# Patient Record
Sex: FEMALE | Race: WHITE | Employment: FULL TIME | URBAN - METROPOLITAN AREA
[De-identification: names, ages, dates, MRNs, and addresses within clinical notes are randomized per-mention and may not be internally consistent; named-entity substitution may affect disease eponyms.]

---

## 2019-05-30 ENCOUNTER — APPOINTMENT (OUTPATIENT)
Dept: GENERAL RADIOLOGY | Facility: CLINIC | Age: 49
End: 2019-05-30
Attending: EMERGENCY MEDICINE

## 2019-05-30 ENCOUNTER — HOSPITAL ENCOUNTER (EMERGENCY)
Facility: CLINIC | Age: 49
Discharge: HOME | End: 2019-05-30
Attending: EMERGENCY MEDICINE | Admitting: EMERGENCY MEDICINE
Payer: MEDICAID

## 2019-05-30 VITALS
DIASTOLIC BLOOD PRESSURE: 94 MMHG | SYSTOLIC BLOOD PRESSURE: 127 MMHG | HEART RATE: 91 BPM | WEIGHT: 147 LBS | TEMPERATURE: 97 F | RESPIRATION RATE: 16 BRPM | OXYGEN SATURATION: 97 %

## 2019-05-30 DIAGNOSIS — S49.91XA SHOULDER INJURY, RIGHT, INITIAL ENCOUNTER: ICD-10-CM

## 2019-05-30 PROCEDURE — 99283 EMERGENCY DEPT VISIT LOW MDM: CPT | Mod: Z6 | Performed by: EMERGENCY MEDICINE

## 2019-05-30 PROCEDURE — 73030 X-RAY EXAM OF SHOULDER: CPT | Mod: TC,RT

## 2019-05-30 PROCEDURE — 99283 EMERGENCY DEPT VISIT LOW MDM: CPT | Performed by: EMERGENCY MEDICINE

## 2019-05-30 NOTE — LETTER
May 30, 2019      To Whom It May Concern:      Andra Blanchard was seen in our Emergency Department today, 05/30/19.  I expect her condition to improve over the next 7 days.  She may return to work but needs to rest her right arm until cleared by her doctor.     Sincerely,        Oz Jay MD

## 2019-05-30 NOTE — ED TRIAGE NOTES
Presents with R) shoulder injury from being hit by her dog in the yard yesterday afternoon. Shahla Diop RN

## 2019-05-30 NOTE — ED AVS SNAPSHOT
[facility] Emergency Department  [address] [place] [address]  Phone:  [phone]  Fax:  [phone]                                    Andra Blanchard   MRN: [id number]    Department:  [facility] Emergency Department   Date of Visit:  5/30/2019           After Visit Summary Signature Page    I have received my discharge instructions, and my questions have been answered. I have discussed any challenges I see with this plan with the nurse or doctor.    ..........................................................................................................................................  Patient/Patient Representative Signature      ..........................................................................................................................................  Patient Representative Print Name and Relationship to Patient    ..................................................               ................................................  Date                                   Time    ..........................................................................................................................................  Reviewed by Signature/Title    ...................................................              ..............................................  Date                                               Time          22EPIC Rev 08/18
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
## 2019-05-30 NOTE — DISCHARGE INSTRUCTIONS
Most likely you have a partial tear of your rotator cuff.  As discussed I recommend ice, ibuprofen, range of motion as tolerated.  Follow-up with sports medicine doctor and if your symptoms persist they may want to pursue an MRI.  Physical therapy will likely be helpful for this.  Your x-ray was normal.
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
## 2019-06-03 ENCOUNTER — TELEPHONE (OUTPATIENT)
Dept: ORTHOPEDICS | Facility: OTHER | Age: 49
End: 2019-06-03

## 2019-06-03 ENCOUNTER — OFFICE VISIT (OUTPATIENT)
Dept: ORTHOPEDICS | Facility: OTHER | Age: 49
End: 2019-06-03
Payer: COMMERCIAL

## 2019-06-03 VITALS
BODY MASS INDEX: 27.6 KG/M2 | SYSTOLIC BLOOD PRESSURE: 128 MMHG | DIASTOLIC BLOOD PRESSURE: 76 MMHG | WEIGHT: 150 LBS | HEIGHT: 62 IN

## 2019-06-03 DIAGNOSIS — S49.91XA INJURY OF RIGHT SHOULDER, INITIAL ENCOUNTER: Primary | ICD-10-CM

## 2019-06-03 PROCEDURE — 99203 OFFICE O/P NEW LOW 30 MIN: CPT | Performed by: PHYSICAL MEDICINE & REHABILITATION

## 2019-06-03 RX ORDER — LEVOTHYROXINE SODIUM 50 UG/1
TABLET ORAL
COMMUNITY
Start: 2019-04-29

## 2019-06-03 RX ORDER — VALSARTAN AND HYDROCHLOROTHIAZIDE 160; 12.5 MG/1; MG/1
TABLET, FILM COATED ORAL
COMMUNITY
Start: 2018-11-29

## 2019-06-03 ASSESSMENT — MIFFLIN-ST. JEOR: SCORE: 1261.27

## 2019-06-03 NOTE — TELEPHONE ENCOUNTER
Reason for Call:  Form, our goal is to have forms completed with 72 hours, however, some forms may require a visit or additional information.    Type of letter, form or note:  FMLA    Who is the form from?: Employer (if other please explain)    Where did the form come from: form was faxed in    What clinic location was the form placed at?: [facility] - [phone]    Where the form was placed: DRs Box/Folder    What number is listed as a contact on the form?: [phone]       Additional comments: Please complete and fax back to [phone].    Call taken on 6/3/2019 at 12:52 PM by Ignacio Carlton

## 2019-06-03 NOTE — LETTER
Renetta 3, 2019      Andra Blanchard  [address]  [place] [address]        To Whom It May Concern:    Andra Blanchard  was seen on 6/3/19 for a right shoulder injury. 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 She will be seen in follow up in 2 weeks for re-evaluation. Thank you for your help in advance.         Sincerely,            Viviana Humphrey MD

## 2019-06-03 NOTE — PATIENT INSTRUCTIONS
Today's Plan of Care:  -Ice or ice massage 15-20 minutes for pain relief or swelling as needed  -Patient's preferred over the counter medication as directed on packaging as needed for pain or soreness.  -Provided home exercises. 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      -We also discussed other future treatment options:  MRI imaging and physical therapy    Follow Up: 2 weeks or sooner if symptoms fail to improve or worsen. Please call with any questions or concerns.

## 2019-06-03 NOTE — TELEPHONE ENCOUNTER
Reason for Call:  Other Letter for work    Detailed comments: The letter that she got from Dr Humphrey needs to be more specific. 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 Please fax to work. [phone] Tam Krueger    Phone Number Patient can be reached at: Home number on file [phone] (home)    Best Time: any    Can we leave a detailed message on this number? YES    Call taken on 6/3/2019 at 12:10 PM by Erum Pena

## 2019-06-03 NOTE — LETTER
Renetta 3, 2019      Andra Blanchard  [address]  [place] [address]        To Whom It May Concern:    Andra Blanchard  was seen on 6/3/19 for a right shoulder injury. She is unable to work at this time. She will be seen in follow up in 2 weeks for re-evaluation. Thank you for your help in advance.         Sincerely,        Viviana Humphrey MD
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
## 2019-06-03 NOTE — TELEPHONE ENCOUNTER
[facility] [phone]. Confirmed sent by [employer] on 6/3/2019 at 6:40pm.  ([id number])    Jewell Stevens M.Ed., LAT, ATC  Clinic Coordinator for Dr. Charlene Humphrey

## 2019-06-13 NOTE — PROGRESS NOTES
Sports Medicine Clinic Visit - Interim History June 13, 2019    Initial Visit Date 6/3/2019  Initial Injury Date 5/29/19    PCP: No Ref-Primary, Physician    Andraulises Blanchard is a 48 year old female who is seen in follow up for a right shoulder injury. Since last visit on 6/3/2019 patient has had ~80% improvement in the shoulder. She notes that she still gets sharp pain in the shoulder with certain movement such as prolonged abduction, reaching overhead and applying hairspray. She has been avoiding heavy lifting. She gets fatigue with holding her arm up to ear or drive for long periods of time. She also notes soreness in her lateral forearm. She rates the pain at a 2/10 currently.  Symptoms are relieved with rest and home exercises.  Symptoms are worsened by certain movement. 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    - Now ~ 2.5 weeks from initial injury      Review of Systems  Musculoskeletal: as above  Remainder of review of systems is negative including constitutional, eyes, ENT, CV, pulmonary, GI, , endocrine, skin, hematologic, and neurologic except as noted in HPI or medical history.    History reviewed. No pertinent past surgical/medical/family/social history other than as mentioned in HPI.    There is no problem list on file for this patient.    Past Medical History:   Diagnosis Date     Hypertension      Hyperthyroidism      Past Surgical History:   Procedure Laterality Date     NOSE SURGERY       No family history on file.  Social History     Socioeconomic History     Marital status: Single     Spouse name: Not on file     Number of children: Not on file     Years of education: Not on file     Highest education level: Not on file   Occupational History     Not on file   Social Needs     Financial resource strain: Not on file     Food insecurity:     Worry: Not on file     Inability: Not on file     Transportation needs:     Medical: Not on file      "Non-medical: Not on file   Tobacco Use     Smoking status: Never Smoker     Smokeless tobacco: Never Used   Substance and Sexual Activity     Alcohol use: Not on file     Drug use: Not on file     Sexual activity: Not on file   Lifestyle     Physical activity:     Days per week: Not on file     Minutes per session: Not on file     Stress: Not on file   Relationships     Social connections:     Talks on phone: Not on file     Gets together: Not on file     Attends Holiness service: Not on file     Active member of club or organization: Not on file     Attends meetings of clubs or organizations: Not on file     Relationship status: Not on file     Intimate partner violence:     Fear of current or ex partner: Not on file     Emotionally abused: Not on file     Physically abused: Not on file     Forced sexual activity: Not on file   Other Topics Concern     Not on file   Social History Narrative     Not on file     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       Current Outpatient Medications   Medication     levothyroxine (SYNTHROID/LEVOTHROID) 50 MCG tablet     valsartan-hydrochlorothiazide (DIOVAN HCT) 160-12.5 MG tablet     No current facility-administered medications for this visit.      No Known Allergies      Objective:  BP (P) 114/74   Ht (P) 1.563 m (5' 1.54\")   Wt (P) 68 kg (150 lb)   BMI (P) 27.85 kg/m      General: Alert and in no distress    Head: Normocephalic, atraumatic  Eyes: no scleral icterus or conjunctival erythema   Oropharynx:  Mucous membranes moist  Skin: no erythema, petechiae, or jaundice  CV: regular rhythm by palpation, 2+ distal pulses  Resp: normal respiratory effort without conversational dyspnea   Psych: normal mood and affect    Gait: Non-antalgic, appropriate coordination and balance   Neuro: Motor strength and sensation as noted below    Musculoskeletal:    Bilateral Shoulder exam    Inspection and Posture:       normal    Skin:        no " visible deformities    Tenderness:  None    ROM:        Full active ROM with flexion, extension, abduction, and adduction bilaterally.  Right external rotation and internal rotation behind the back are decreased.    Painful motions:  -Mild sarkis with right shoulder flexion, internal rotation behind the back, and external rotation.    Strength:        shoulder shrug 5/5 bilaterally       shoulder abduction 5/5 bilaterally       shoulder flexion 5/5 bilaterally       shoulder internal rotation 5/5 bilaterally       shoulder external rotation 4/5 right, 5/5 left       elbow flexion 5/5 bilaterally       elbow extension 5/5 bilaterally       forearm pronation 5/5 bilaterally       forearm supination 5/5 bilaterally       wrist flexion 5/5 bilaterally       wrist extension 5/5 bilaterally        strength 5/5 bilaterally       finger abduction 5/5 bilaterally    Sensation:        normal sensation over shoulder and upper extremity       Radiology:  Independent visualization of images performed  XR SHOULDER RT G/E 3 VW 5/30/2019 5:36 PM     HISTORY: Injury.      COMPARISON: None.                                                                      IMPRESSION: No evidence of acute fracture or malalignment. Mild  degenerative changes of the acromioclavicular joint.      MARIBEL HENRIQUEZ MD    Large Joint Injection/Arthocentesis: R subacromial bursa  Date/Time: 6/17/2019 6:54 PM  Performed by: Viviana Humphrey MD  Authorized by: Viviana Humphrey MD     Indications:  Pain  Needle Size:  25 G  Guidance: landmark guided    Approach:  Posterior  Location:  Shoulder      Site:  R subacromial bursa  Medications:  2 mL lidocaine 1 %; 40 mg triamcinolone 40 MG/ML  Medications comment:  2ml 0.5% bupivicaine  NDC:7243-1301-90  Lot: [id number]  8/1/20    Outcome:  Tolerated well, no immediate complications  Procedure discussed: discussed risks, benefits, and alternatives    Consent Given by:   Patient          Assessment:  1. Injury of right shoulder, subsequent encounter        Plan:  Discussed the assessment with the patient and developed a plan together:  -Steroid injection performed today.  Take it easy over the next few days. Keep in mind that the steroid may take up to 3 days to start working and up to 2 weeks to reach maximal effect.  Ice 15-20 minutes as needed for soreness.  Patient's preferred over the counter medication as needed for pain as directed on packaging.  -Continue home exercises. 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  Letters provided.    -We also discussed other future treatment options:  MRI imaging and physical therapy    Follow up as needed if symptoms fail to improve or worsen. Please call with any questions or concerns.       Charlene Humphrey MD, CAQ Sports Medicine  [facility]

## 2019-06-17 ENCOUNTER — OFFICE VISIT (OUTPATIENT)
Dept: ORTHOPEDICS | Facility: OTHER | Age: 49
End: 2019-06-17
Payer: COMMERCIAL

## 2019-06-17 DIAGNOSIS — S49.91XD INJURY OF RIGHT SHOULDER, SUBSEQUENT ENCOUNTER: Primary | ICD-10-CM

## 2019-06-17 PROCEDURE — 99213 OFFICE O/P EST LOW 20 MIN: CPT | Mod: 25 | Performed by: PHYSICAL MEDICINE & REHABILITATION

## 2019-06-17 PROCEDURE — 20610 DRAIN/INJ JOINT/BURSA W/O US: CPT | Mod: RT | Performed by: PHYSICAL MEDICINE & REHABILITATION

## 2019-06-17 RX ORDER — TRIAMCINOLONE ACETONIDE 40 MG/ML
40 INJECTION, SUSPENSION INTRA-ARTICULAR; INTRAMUSCULAR
Status: SHIPPED | OUTPATIENT
Start: 2019-06-17

## 2019-06-17 RX ORDER — LIDOCAINE HYDROCHLORIDE 10 MG/ML
2 INJECTION, SOLUTION INFILTRATION; PERINEURAL
Status: SHIPPED | OUTPATIENT
Start: 2019-06-17

## 2019-06-17 RX ADMIN — LIDOCAINE HYDROCHLORIDE 2 ML: 10 INJECTION, SOLUTION INFILTRATION; PERINEURAL at 18:54

## 2019-06-17 RX ADMIN — TRIAMCINOLONE ACETONIDE 40 MG: 40 INJECTION, SUSPENSION INTRA-ARTICULAR; INTRAMUSCULAR at 18:54

## 2019-06-17 ASSESSMENT — MIFFLIN-ST. JEOR: SCORE: 1261.35

## 2019-06-17 NOTE — LETTER
6/17/2019         RE: Andra Blanchard  [address]  [place] [address]        Dear Colleague,    Thank you for referring your patient, Andra Balnchard, to the [facility]. Please see a copy of my visit note below.    Sports Medicine Clinic Visit - Interim History June 13, 2019    Initial Visit Date 6/3/2019  Initial Injury Date 5/29/19    PCP: No Ref-Primary, Physician    Andra Blanchard is a 48 year old female who is seen in follow up for a right shoulder injury. Since last visit on 6/3/2019 patient has had ~80% improvement in the shoulder. She notes that she still gets sharp pain in the shoulder with certain movement such as prolonged abduction, reaching overhead and applying hairspray. She has been avoiding heavy lifting. She gets fatigue with holding her arm up to ear or drive for long periods of time. She also notes soreness in her lateral forearm. She rates the pain at a 2/10 currently.  Symptoms are relieved with rest and home exercises.  Symptoms are worsened by certain movement. 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    - Now ~ 2.5 weeks from initial injury      Review of Systems  Musculoskeletal: as above  Remainder of review of systems is negative including constitutional, eyes, ENT, CV, pulmonary, GI, , endocrine, skin, hematologic, and neurologic except as noted in HPI or medical history.    History reviewed. No pertinent past surgical/medical/family/social history other than as mentioned in HPI.    There is no problem list on file for this patient.    Past Medical History:   Diagnosis Date     Hypertension      Hyperthyroidism      Past Surgical History:   Procedure Laterality Date     NOSE SURGERY       No family history on file.  Social History     Socioeconomic History     Marital status: Single     Spouse name: Not on file     Number of children: Not on file     Years of education: Not on file     Highest education level:  "Not on file   Occupational History     Not on file   Social Needs     Financial resource strain: Not on file     Food insecurity:     Worry: Not on file     Inability: Not on file     Transportation needs:     Medical: Not on file     Non-medical: Not on file   Tobacco Use     Smoking status: Never Smoker     Smokeless tobacco: Never Used   Substance and Sexual Activity     Alcohol use: Not on file     Drug use: Not on file     Sexual activity: Not on file   Lifestyle     Physical activity:     Days per week: Not on file     Minutes per session: Not on file     Stress: Not on file   Relationships     Social connections:     Talks on phone: Not on file     Gets together: Not on file     Attends Adventism service: Not on file     Active member of club or organization: Not on file     Attends meetings of clubs or organizations: Not on file     Relationship status: Not on file     Intimate partner violence:     Fear of current or ex partner: Not on file     Emotionally abused: Not on file     Physically abused: Not on file     Forced sexual activity: Not on file   Other Topics Concern     Not on file   Social History Narrative     Not on file     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       Current Outpatient Medications   Medication     levothyroxine (SYNTHROID/LEVOTHROID) 50 MCG tablet     valsartan-hydrochlorothiazide (DIOVAN HCT) 160-12.5 MG tablet     No current facility-administered medications for this visit.      No Known Allergies      Objective:  BP (P) 114/74   Ht (P) 1.563 m (5' 1.54\")   Wt (P) 68 kg (150 lb)   BMI (P) 27.85 kg/m       General: Alert and in no distress    Head: Normocephalic, atraumatic  Eyes: no scleral icterus or conjunctival erythema   Oropharynx:  Mucous membranes moist  Skin: no erythema, petechiae, or jaundice  CV: regular rhythm by palpation, 2+ distal pulses  Resp: normal respiratory effort without conversational dyspnea   Psych: normal " mood and affect    Gait: Non-antalgic, appropriate coordination and balance   Neuro: Motor strength and sensation as noted below    Musculoskeletal:    Bilateral Shoulder exam    Inspection and Posture:       normal    Skin:        no visible deformities    Tenderness:  None    ROM:        Full active ROM with flexion, extension, abduction, and adduction bilaterally.  Right external rotation and internal rotation behind the back are decreased.    Painful motions:  -Mild sarkis with right shoulder flexion, internal rotation behind the back, and external rotation.    Strength:        shoulder shrug 5/5 bilaterally       shoulder abduction 5/5 bilaterally       shoulder flexion 5/5 bilaterally       shoulder internal rotation 5/5 bilaterally       shoulder external rotation 4/5 right, 5/5 left       elbow flexion 5/5 bilaterally       elbow extension 5/5 bilaterally       forearm pronation 5/5 bilaterally       forearm supination 5/5 bilaterally       wrist flexion 5/5 bilaterally       wrist extension 5/5 bilaterally        strength 5/5 bilaterally       finger abduction 5/5 bilaterally    Sensation:        normal sensation over shoulder and upper extremity       Radiology:  Independent visualization of images performed  XR SHOULDER RT G/E 3 VW 5/30/2019 5:36 PM     HISTORY: Injury.      COMPARISON: None.                                                                      IMPRESSION: No evidence of acute fracture or malalignment. Mild  degenerative changes of the acromioclavicular joint.      MARIBEL HENRIQUEZ MD    Large Joint Injection/Arthocentesis: R subacromial bursa  Date/Time: 6/17/2019 6:54 PM  Performed by: Viviana Humphrey MD  Authorized by: Viviana Humphrey MD     Indications:  Pain  Needle Size:  25 G  Guidance: landmark guided    Approach:  Posterior  Location:  Shoulder      Site:  R subacromial bursa  Medications:  2 mL lidocaine 1 %; 40 mg triamcinolone 40 MG/ML  Medications comment:   2ml 0.5% bupivicaine  NDC:7897-1476-00  Lot: [id number]  8/1/20    Outcome:  Tolerated well, no immediate complications  Procedure discussed: discussed risks, benefits, and alternatives    Consent Given by:  Patient          Assessment:  1. Injury of right shoulder, subsequent encounter        Plan:  Discussed the assessment with the patient and developed a plan together:  -Steroid injection performed today.  Take it easy over the next few days. Keep in mind that the steroid may take up to 3 days to start working and up to 2 weeks to reach maximal effect.  Ice 15-20 minutes as needed for soreness.  Patient's preferred over the counter medication as needed for pain as directed on packaging.  -Continue home exercises. 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  Letters provided.    -We also discussed other future treatment options:  MRI imaging and physical therapy    Follow up as needed if symptoms fail to improve or worsen. Please call with any questions or concerns.       Charlene Humphrey MD, [facility] Sports Medicine  [facility]        Again, thank you for allowing me to participate in the care of your patient.        Sincerely,        Viviana Humphrey MD

## 2019-06-17 NOTE — LETTER
June 17, 2019      Andra Blanchard  [address]  [place] [address]        To Whom It May Concern:    Andra Blanchard  was seen in follow up on  6/17/19 for a right shoulder injury. 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 Also, she is cleared to continue working her  job.  She will be seen in follow up only if symptoms do not improve. Thank you for your help in advance.          Sincerely,            Viviana Humphrey MD

## 2019-06-17 NOTE — LETTER
June 17, 2019      Andra Blanchard  [address]  [place] [address]        To Whom It May Concern:    Andra Blanchard  was seen in follow up on  6/17/19 for a right shoulder injury. 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 Also, she is cleared to continue working her  job.  She will be seen in follow up only if symptoms do not improve. Thank you for your help in advance.          Sincerely,            Viviana Humphrey MD

## 2019-06-17 NOTE — PATIENT INSTRUCTIONS
Today's Plan of Care:  -Steroid injection performed today.  Take it easy over the next few days. Keep in mind that the steroid may take up to 3 days to start working and up to 2 weeks to reach maximal effect.  Ice 15-20 minutes as needed for soreness.  Patient's preferred over the counter medication as needed for pain as directed on packaging.  -Continue home exercises. 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 Letters provided.      -We also discussed other future treatment options:  MRI imaging and physical therapy    Follow up as needed if symptoms fail to improve or worsen. Please call with any questions or concerns.

## 2019-06-17 NOTE — LETTER
June 17, 2019      Andra Blanchard  [address] [facility] [address]        To Whom It May Concern:    Andra Blanchard  was seen in follow up on  6/17/19 for a right shoulder injury. 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 She will be seen in follow up only if symptoms do not improve. Thank you for your help in advance.          Sincerely,            Viviana Humphrey MD